# Patient Record
Sex: MALE | Race: BLACK OR AFRICAN AMERICAN | NOT HISPANIC OR LATINO | URBAN - METROPOLITAN AREA
[De-identification: names, ages, dates, MRNs, and addresses within clinical notes are randomized per-mention and may not be internally consistent; named-entity substitution may affect disease eponyms.]

---

## 2017-11-27 ENCOUNTER — EMERGENCY (EMERGENCY)
Facility: HOSPITAL | Age: 57
LOS: 1 days | Discharge: HOME | End: 2017-11-27
Attending: EMERGENCY MEDICINE
Payer: COMMERCIAL

## 2017-11-27 VITALS
SYSTOLIC BLOOD PRESSURE: 219 MMHG | OXYGEN SATURATION: 98 % | HEIGHT: 64 IN | HEART RATE: 70 BPM | DIASTOLIC BLOOD PRESSURE: 115 MMHG | TEMPERATURE: 98 F | WEIGHT: 177.91 LBS | RESPIRATION RATE: 18 BRPM

## 2017-11-27 VITALS
RESPIRATION RATE: 18 BRPM | TEMPERATURE: 98 F | DIASTOLIC BLOOD PRESSURE: 99 MMHG | HEART RATE: 78 BPM | OXYGEN SATURATION: 100 % | SYSTOLIC BLOOD PRESSURE: 194 MMHG

## 2017-11-27 PROCEDURE — 99283 EMERGENCY DEPT VISIT LOW MDM: CPT

## 2017-11-27 RX ORDER — AMLODIPINE BESYLATE 2.5 MG/1
5 TABLET ORAL ONCE
Qty: 0 | Refills: 0 | Status: COMPLETED | OUTPATIENT
Start: 2017-11-27 | End: 2017-11-27

## 2017-11-27 RX ORDER — ATENOLOL 25 MG/1
50 TABLET ORAL ONCE
Qty: 0 | Refills: 0 | Status: COMPLETED | OUTPATIENT
Start: 2017-11-27 | End: 2017-11-27

## 2017-11-27 RX ADMIN — ATENOLOL 50 MILLIGRAM(S): 25 TABLET ORAL at 16:45

## 2017-11-27 RX ADMIN — AMLODIPINE BESYLATE 5 MILLIGRAM(S): 2.5 TABLET ORAL at 16:45

## 2017-11-27 NOTE — ED PROVIDER NOTE - OBJECTIVE STATEMENT
57M hx HTN sent from sports medicine clinic for hypertensive readings.  Pt states he forget to take his medications today and occasionally will miss a dose.  Pt asymptomatic, denying headache, visual changes, chest pain shortness of breath, change in uirnary function.  Pt states he feel his normal baseline.  Follows at sports clinic for work-related musculoskeletal injury involving the upper extremities and back.      Amlodopine, Quinalapril, Atenolol

## 2017-11-27 NOTE — ED PROVIDER NOTE - PLAN OF CARE
Take your medications as prescribed.   Follow-up with your primary doctor.  Return for any new/worsening symptoms or concerns.

## 2017-11-27 NOTE — ED PROVIDER NOTE - MEDICAL DECISION MAKING DETAILS
57M hx HTN presents for HTN reading at clinic.  missed morning meds.  asymptomatic.  well-appearing.  exam unremarkable.  will give home meds expect quinalapril which is not carried in hospital pharmacy and discharge with pmd follow-up and return precautions.

## 2017-11-27 NOTE — ED PROVIDER NOTE - CARE PLAN
Principal Discharge DX:	HTN (hypertension)  Instructions for follow-up, activity and diet:	Take your medications as prescribed.   Follow-up with your primary doctor.  Return for any new/worsening symptoms or concerns.

## 2017-11-27 NOTE — ED PROVIDER NOTE - PHYSICAL EXAMINATION
(Attending - Conchis) NAD, AAOx3, NCAT, MMM, Trachea midline, PERRL, CTAB, Non-tachy, Normal perfusion, 2+ radial pulses b/l, soft, NTND, No edema, No deformity of extremities, Appropriate, Cooperative, CN grossly intact, Normal coordination

## 2017-11-27 NOTE — ED ADULT TRIAGE NOTE - CHIEF COMPLAINT QUOTE
as per  the ems pt bought in from the dr office for elevated blood pressure . pt denies any chest pain

## 2017-11-27 NOTE — ED ADULT NURSE NOTE - OBJECTIVE STATEMENT
Pt went to see MD and BP was high as per pt. Repeat /103. Pt very anxious, states he did not take todays BP meds. Denies chest pain, headache, dizziness, vision changes, asymptomatic.

## 2018-08-17 ENCOUNTER — TRANSCRIPTION ENCOUNTER (OUTPATIENT)
Age: 58
End: 2018-08-17

## 2018-10-15 ENCOUNTER — APPOINTMENT (OUTPATIENT)
Dept: GASTROENTEROLOGY | Facility: CLINIC | Age: 58
End: 2018-10-15

## 2018-12-03 NOTE — ED ADULT NURSE NOTE - PRIMARY CARE PROVIDER
Telephone Encounter by Samia Hartley at 09/07/18 09:56 AM     Author:  Samia Hartley Service:  (none) Author Type:  Patient      Filed:  09/07/18 09:58 AM Encounter Date:  9/7/2018 Status:  Signed     :  Samia Hartley (Patient )              PERLITA HWANG    Patient Age: 55 year old   Refill request by:[NP1.1T] Phone.  Caller informed to check with the pharmacy later for their refill.  If problems arise, we will contact patient.[NP1.1M]  Refill to be:[NP1.1T] ePrescribed to[NP1.1M]   Pharmacy     [place] [id number] [facility] [id number]    Phone: [phone][NP1.2T]          Medication requested to be refilled:[NP1.1T]   Requested Prescriptions     Pending Prescriptions Disp Refills   • losartan (COZAAR) 25 MG tablet 90 Tab 1     Sig: Take 1 Tab by mouth daily. Collaborating MD: Dr. Mirella Montague MD[NP1.2T]           Next and Last Visit with Provider and Department  Next visit with [facility] is on No match found  Next visit with [facility] is on No match found   Last visit with [facility] was on 05/21/2018 at  3:20 PM in [facility]  Last visit with [facility] was on 05/21/2018 at  3:20 PM in [facility]      WEIGHT AND HEIGHT: As of 05/21/2018 weight is 130 lbs.(58.968 kg). Height is 5' 0\"(1.524 m).   BMI is 25.39 kg/(m^2) calculated from:     Height 5' 0\" (1.524 m) as of 5/21/18     Weight 130 lb (58.968 kg) as of 5/21/18[NP1.1T]      No Known Allergies[NP1.2T]  Current outpatient prescriptions       Medication  Sig Dispense Refill   • bisacodyl (DULCOLAX) 5 MG EC tablet See Colonoscopy Instructions. 2 Tab 0   • simethicone (MYLICON) 125 MG chewable tablet See Colonoscopy instructions. 2 Tab 0   • PEG 3350-KCl-NaBcb-NaCl-NaSulf (GOLYTELY) 236 G suspension See Colonoscopy Instructions. 4000 mL 0   • losartan (COZAAR) 25 MG tablet Take 1 Tab by mouth daily. Collaborating MD: Dr. Mirella Montague,  MD 90 Tab 1   • trazodone (DESYREL) 50 MG tablet Take 1 Tab by mouth nightly. Collaborating MD: Dr. Mirella Montague MD 30 Tab 0   • lorazepam (ATIVAN) 0.5 MG tablet Take 1 Tab by mouth nightly as needed for Anxiety. Collaborating MD: Dr. Mirella Montague MD 20 Tab 0   • zolpidem (AMBIEN CR) 12.5 MG CR tablet Take 1 Tab by mouth nightly as needed for Sleep. Collaborating MD: Dr. Mirella Montague MD 30 Tab 1        ROUTING:[NP1.1T] Patient's physician/staff[NP1.1M]        PCP: Mirella Montague MD         INS: Payor: [employer] / Plan: *No Plan* / Product Type: *No Product type* / Note: This is the primary coverage, but no account was found for this location or the patient's primary location.   ADDRESS:  [address] [place] [address] [address] [place] [address][NP1.1T]       Revision History        User Key Date/Time User Provider Type Action    > NP1.2 09/07/18 09:58 AM Samia Hartley Patient  Sign     NP1.1 09/07/18 09:56 AM Samia Hartley Patient      M - Manual, T - Template             none

## 2019-01-03 ENCOUNTER — APPOINTMENT (OUTPATIENT)
Dept: GASTROENTEROLOGY | Facility: CLINIC | Age: 59
End: 2019-01-03

## 2021-10-08 ENCOUNTER — NON-APPOINTMENT (OUTPATIENT)
Age: 61
End: 2021-10-08

## 2021-10-08 ENCOUNTER — APPOINTMENT (OUTPATIENT)
Dept: CARDIOLOGY | Facility: CLINIC | Age: 61
End: 2021-10-08
Payer: COMMERCIAL

## 2021-10-08 VITALS — DIASTOLIC BLOOD PRESSURE: 90 MMHG | SYSTOLIC BLOOD PRESSURE: 150 MMHG

## 2021-10-08 VITALS
OXYGEN SATURATION: 97 % | WEIGHT: 178 LBS | DIASTOLIC BLOOD PRESSURE: 93 MMHG | BODY MASS INDEX: 29.3 KG/M2 | RESPIRATION RATE: 16 BRPM | SYSTOLIC BLOOD PRESSURE: 179 MMHG | HEART RATE: 66 BPM | HEIGHT: 65.5 IN

## 2021-10-08 DIAGNOSIS — M10.9 GOUT, UNSPECIFIED: ICD-10-CM

## 2021-10-08 DIAGNOSIS — M79.606 PAIN IN LEG, UNSPECIFIED: ICD-10-CM

## 2021-10-08 PROCEDURE — 93000 ELECTROCARDIOGRAM COMPLETE: CPT

## 2021-10-08 PROCEDURE — 99204 OFFICE O/P NEW MOD 45 MIN: CPT

## 2021-10-08 RX ORDER — ATENOLOL 25 MG/1
25 TABLET ORAL DAILY
Refills: 0 | Status: DISCONTINUED | COMMUNITY
End: 2021-10-08

## 2021-10-08 RX ORDER — QUINAPRIL HYDROCHLORIDE 40 MG/1
40 TABLET, FILM COATED ORAL DAILY
Refills: 0 | Status: DISCONTINUED | COMMUNITY
End: 2021-10-08

## 2021-10-08 NOTE — PHYSICAL EXAM
[Soft] : abdomen soft [Non Tender] : non-tender [Normal] : no edema, no cyanosis, no clubbing, no varicosities [Moves all extremities] : moves all extremities [Alert and Oriented] : alert and oriented [Normal S1, S2] : normal S1, S2 [No Murmur] : no murmur [S4] : S4

## 2021-10-12 ENCOUNTER — APPOINTMENT (OUTPATIENT)
Dept: CARDIOLOGY | Facility: CLINIC | Age: 61
End: 2021-10-12
Payer: COMMERCIAL

## 2021-10-12 PROCEDURE — 93923 UPR/LXTR ART STDY 3+ LVLS: CPT

## 2021-10-20 ENCOUNTER — APPOINTMENT (OUTPATIENT)
Dept: CARDIOLOGY | Facility: CLINIC | Age: 61
End: 2021-10-20
Payer: COMMERCIAL

## 2021-10-20 PROCEDURE — 93306 TTE W/DOPPLER COMPLETE: CPT

## 2021-11-19 ENCOUNTER — APPOINTMENT (OUTPATIENT)
Dept: CARDIOLOGY | Facility: CLINIC | Age: 61
End: 2021-11-19
Payer: COMMERCIAL

## 2021-11-19 PROCEDURE — A9500: CPT

## 2021-11-19 PROCEDURE — 78452 HT MUSCLE IMAGE SPECT MULT: CPT

## 2021-11-19 PROCEDURE — 93015 CV STRESS TEST SUPVJ I&R: CPT

## 2021-11-19 RX ADMIN — REGADENOSON 0 MG/5ML: 0.08 INJECTION, SOLUTION INTRAVENOUS at 00:00

## 2021-11-23 RX ORDER — REGADENOSON 0.08 MG/ML
0.4 INJECTION, SOLUTION INTRAVENOUS
Qty: 4 | Refills: 0 | Status: COMPLETED | OUTPATIENT
Start: 2021-11-19

## 2021-12-07 NOTE — ADDENDUM
[FreeTextEntry1] : 12/7/2021: Echo with normal LV function and nuclear stress testing without ischemia. Low CV risk for prostate biopsy

## 2021-12-07 NOTE — DISCUSSION/SUMMARY
[FreeTextEntry1] : Patient is a 62yo M with HTN, HLD, gout, CKD here for cardiac evaluation of an abnormal ECG.\par -ECG mildly abnormal with iRBBB and borderline LAFB, needs further eval for structural disease and ischemia\par -Mild dyspnea as well\par -Multiple risk factors\par -BP high, even on recheck and 140s systolic at home per patient. ALso S4 on exam\par \par 1. Echo and nuclear stress test to evaluate dyspnea/abnormal ECG/HTN/CKD/HLD\par 2. ELIZABETH to eval leg symptoms, ? claudication\par 3. States had been on atenolol but  ? recently stopped, will use metoprolol ER 25mg daily instead now as needs better BP control. Also mild renal insufficiency\par 4. Needs to take hydralazine TID As well\par 5. ADd statin for HLD, atorvastatin 40mg qhs\par 6. REnal and urology eval pending for CKD and elevated PSA\par 7. Recommend aggressive diet and lifestyle modifications \par 7. Follow up after testing

## 2021-12-07 NOTE — HISTORY OF PRESENT ILLNESS
[FreeTextEntry1] : Patient is a 62yo M with HTN, HLD, gout, CKD here for cardiac evaluation of an abnormal ECG. NO regular exercise. Denies any chest pain/pressure, does get intermittent chronic shoulder pain and limited ROM. If exerts himself will note some trouble breathing. Mild dyspnea going up stairs and legs get weak and pain. FEels like burning pain. \par HAd only been taking hydralazine daily instead of TID. \par \par Works as .  with 4 kids that are grown. \par \par ROS: nighttime heart burn, all others negative

## 2021-12-07 NOTE — ASSESSMENT
[FreeTextEntry1] : ECG: SR, iRBBB, leftward axis\par \par \par Creat 1.56\par  HDL 32   (8/2021) \par A1C 5.9

## 2021-12-17 ENCOUNTER — APPOINTMENT (OUTPATIENT)
Dept: CARDIOLOGY | Facility: CLINIC | Age: 61
End: 2021-12-17
Payer: COMMERCIAL

## 2021-12-17 VITALS
WEIGHT: 176 LBS | BODY MASS INDEX: 28.97 KG/M2 | HEIGHT: 65.5 IN | SYSTOLIC BLOOD PRESSURE: 152 MMHG | RESPIRATION RATE: 16 BRPM | HEART RATE: 84 BPM | DIASTOLIC BLOOD PRESSURE: 100 MMHG | OXYGEN SATURATION: 96 %

## 2021-12-17 DIAGNOSIS — R06.00 DYSPNEA, UNSPECIFIED: ICD-10-CM

## 2021-12-17 PROCEDURE — 99214 OFFICE O/P EST MOD 30 MIN: CPT

## 2021-12-17 RX ORDER — HYDRALAZINE HYDROCHLORIDE 50 MG/1
50 TABLET ORAL 3 TIMES DAILY
Refills: 0 | Status: DISCONTINUED | COMMUNITY
End: 2021-12-17

## 2021-12-17 RX ORDER — LISINOPRIL 40 MG/1
40 TABLET ORAL DAILY
Qty: 90 | Refills: 2 | Status: DISCONTINUED | COMMUNITY
Start: 2021-10-08 | End: 2021-12-17

## 2021-12-17 NOTE — DISCUSSION/SUMMARY
[FreeTextEntry1] : Patient is a 62yo M with HTN, HLD, gout, CKD here for cardiac evaluation of an abnormal ECG.\par -ECG mildly abnormal with iRBBB and borderline LAFB, \par -Echo 10/2021 with mild LVH (likely due to HTN) but normal BiV function, no PHTN, Mild MR needs surveillance only\par -Normal ELIZABETH/PVR 2021\par -PHARM NUCLEAR stress test 11/2021 negative for ischemia/infarct \par -Still needs better BP control , at home states numbers 140/80s. Currently taking hydralazine 100mg bid instead of 50 bid. Also on amlodipine 10mg daily, metoprolol ER 25mg daily. DOesnt think taking Lisinopril. \par \par \par 1. Increase metoprolol ER to 50mg daily\par 2. Recommend 30 minutes moderate intensity aerobic activity 5 days per week , leg symptoms from deconditioning\par 3. Continue statin\par 4. Recommend aggressive diet and lifestyle modifications , needs to cut back sodium. F/u nutritionist as well\par 5. REnal and urology follow up , biopsy results pending\par 6. FOllow up 3 months to re-evaluate BP, check BID the 2 weeks prior and bring in cuff

## 2021-12-17 NOTE — HISTORY OF PRESENT ILLNESS
[FreeTextEntry1] : Patient is a 60yo M with HTN, HLD, gout, CKD here for cardiac follow up  of an abnormal ECG. NO regular exercise. Denies any chest pain/pressure, does get intermittent chronic shoulder pain and limited ROM. If exerts himself will note some trouble breathing. Mild dyspnea going up stairs and legs get weak and pain. FEels like burning pain. Patient underwent cardiac testing after last visit. Put on metoprolol, advised taking hydralazine TID and started statin. HAd prostate biopsy recently, awaiting results. No recurrent dyspnea and no CP. \par \par Works as .  with 4 kids that are grown. \par \par ROS: nighttime heart burn, all others negative

## 2021-12-17 NOTE — PHYSICAL EXAM
[Normal S1, S2] : normal S1, S2 [No Murmur] : no murmur [S4] : S4 [Soft] : abdomen soft [Non Tender] : non-tender [Normal] : no edema, no cyanosis, no clubbing, no varicosities [Moves all extremities] : moves all extremities [Alert and Oriented] : alert and oriented

## 2021-12-17 NOTE — ASSESSMENT
[FreeTextEntry1] : \par \par Creat 1.56\par  HDL 32   (8/2021) \par A1C 5.9 \par \par ELIZABETH/PVR 10/2021:\par 1. R 1.02 L 1.02\par 2. Normal PVR\par \par ECHO 10/2021:\par 1. Mild LVH, EF 60-65%\par 2. Grade I diastolic dysfunction\par 3. Normal RV/LA/RA \par 4. Trivial AI\par 5. Mild MR \par \par PHARM NUCLEAR STRESS TEST 11/2021:\par 1. Negative for ischemia/infarct, EF 62%\par 2. BP hypertensive baseline, normal response

## 2022-01-13 RX ORDER — KIT FOR THE PREPARATION OF TECHNETIUM TC99M SESTAMIBI 1 MG/5ML
INJECTION, POWDER, LYOPHILIZED, FOR SOLUTION PARENTERAL
Refills: 0 | Status: COMPLETED | OUTPATIENT
Start: 2022-01-13

## 2022-01-13 RX ADMIN — KIT FOR THE PREPARATION OF TECHNETIUM TC99M SESTAMIBI 0: 1 INJECTION, POWDER, LYOPHILIZED, FOR SOLUTION PARENTERAL at 00:00

## 2022-04-29 ENCOUNTER — NON-APPOINTMENT (OUTPATIENT)
Age: 62
End: 2022-04-29

## 2022-04-29 ENCOUNTER — APPOINTMENT (OUTPATIENT)
Dept: CARDIOLOGY | Facility: CLINIC | Age: 62
End: 2022-04-29
Payer: COMMERCIAL

## 2022-04-29 VITALS
HEART RATE: 76 BPM | SYSTOLIC BLOOD PRESSURE: 150 MMHG | OXYGEN SATURATION: 95 % | HEIGHT: 65.5 IN | WEIGHT: 183 LBS | RESPIRATION RATE: 16 BRPM | DIASTOLIC BLOOD PRESSURE: 94 MMHG | BODY MASS INDEX: 30.12 KG/M2

## 2022-04-29 PROCEDURE — 99214 OFFICE O/P EST MOD 30 MIN: CPT

## 2022-04-29 PROCEDURE — 93000 ELECTROCARDIOGRAM COMPLETE: CPT

## 2022-04-29 RX ORDER — DUTASTERIDE AND TAMSULOSIN HYDROCHLORIDE .5; .4 MG/1; MG/1
CAPSULE ORAL
Refills: 0 | Status: ACTIVE | COMMUNITY

## 2022-04-29 RX ORDER — OMEPRAZOLE 40 MG/1
40 CAPSULE, DELAYED RELEASE ORAL
Refills: 0 | Status: ACTIVE | COMMUNITY

## 2022-04-29 NOTE — ASSESSMENT
[FreeTextEntry1] : ECG: SR, inferior Qs, IVCD, NSST\par \par Creat 1.56\par  HDL 32   (8/2021) \par A1C 5.9 \par \par ELIZABETH/PVR 10/2021:\par 1. R 1.02 L 1.02\par 2. Normal PVR\par \par ECHO 10/2021:\par 1. Mild LVH, EF 60-65%\par 2. Grade I diastolic dysfunction\par 3. Normal RV/LA/RA \par 4. Trivial AI\par 5. Mild MR \par \par PHARM NUCLEAR STRESS TEST 11/2021:\par 1. Negative for ischemia/infarct, EF 62%\par 2. BP hypertensive baseline, normal response

## 2022-04-29 NOTE — DISCUSSION/SUMMARY
[FreeTextEntry1] : Patient is a 61yo M with HTN, HLD, gout, CKD here for cardiac follow up\par -Echo 10/2021 with mild LVH (likely due to HTN) but normal BiV function, no PHTN, Mild MR\par -Normal ELIZABETH/PVR 2021\par -PHARM NUCLEAR stress test 11/2021 negative for ischemia/infarct \par -BP high today, seems component white coat HTN but also misses doses of meds at times. Will increase beta blocker and have him monitor at home. \par \par \par 1. INcrease metoprolol ER to 100mg daily, no other med changes\par 2. Recommend 30 minutes moderate intensity aerobic activity 5 days per week \par 3. Continue statin\par 4. Recommend aggressive diet and lifestyle modifications , needs to cut back sodium. \par 5. Recheck BW with PMD\par 6. Follow up 3 months, will take next couple weeks to monitor BP at home as well

## 2022-04-29 NOTE — HISTORY OF PRESENT ILLNESS
[FreeTextEntry1] : Patient is a 63yo M with HTN, HLD, GERD, gout, CKD here for cardiac follow up. NO regular exercise. Denies any chest pain/pressure, does get intermittent chronic shoulder pain and limited ROM. No CP/SOB Patient denies PND/orthopnea/edema/palpitations/syncope/claudication. Increased metoprolol to 50mg at last visit and added statin. HAs not been checking BP at home lately. Missed couple days of BP meds, did take today. \par \par Works as .  with 4 kids that are grown. \par \par ROS: nighttime heart burn still, all others negative

## 2022-05-27 ENCOUNTER — NON-APPOINTMENT (OUTPATIENT)
Age: 62
End: 2022-05-27

## 2022-07-28 NOTE — HISTORY OF PRESENT ILLNESS
[FreeTextEntry1] : Patient is a 61yo M with HTN, HLD, GERD, gout, CKD here for cardiac follow up. NO regular exercise. Denies any chest pain/pressure, does get intermittent chronic shoulder pain and limited ROM. No CP/SOB Patient denies PND/orthopnea/edema/palpitations/syncope/claudication. Increased metoprolol to 100mg at last visit\par \par Works as .  with 4 kids that are grown. \par \par ROS: nighttime heart burn still, all others negative

## 2022-07-28 NOTE — DISCUSSION/SUMMARY
[FreeTextEntry1] : Patient is a 63yo M with HTN, HLD, gout, CKD here for cardiac follow up\par -Echo 10/2021 with mild LVH (likely due to HTN) but normal BiV function, no PHTN, Mild MR\par -Normal ELIZABETH/PVR 2021\par -PHARM NUCLEAR stress test 11/2021 negative for ischemia/infarct \par -\par \par \par 1. \par 2. Recommend 30 minutes moderate intensity aerobic activity 5 days per week \par 3. Continue statin\par 4. Recommend aggressive diet and lifestyle modifications , needs to cut back sodium. \par 5.

## 2022-07-29 ENCOUNTER — APPOINTMENT (OUTPATIENT)
Dept: CARDIOLOGY | Facility: CLINIC | Age: 62
End: 2022-07-29

## 2022-12-06 ENCOUNTER — NON-APPOINTMENT (OUTPATIENT)
Age: 62
End: 2022-12-06

## 2022-12-06 ENCOUNTER — APPOINTMENT (OUTPATIENT)
Dept: CARDIOLOGY | Facility: CLINIC | Age: 62
End: 2022-12-06
Payer: COMMERCIAL

## 2022-12-06 VITALS — HEART RATE: 97 BPM | WEIGHT: 189 LBS | BODY MASS INDEX: 31.49 KG/M2 | HEIGHT: 65 IN | RESPIRATION RATE: 14 BRPM

## 2022-12-06 VITALS — DIASTOLIC BLOOD PRESSURE: 70 MMHG | SYSTOLIC BLOOD PRESSURE: 148 MMHG

## 2022-12-06 VITALS — SYSTOLIC BLOOD PRESSURE: 130 MMHG | DIASTOLIC BLOOD PRESSURE: 86 MMHG

## 2022-12-06 PROCEDURE — 93000 ELECTROCARDIOGRAM COMPLETE: CPT

## 2022-12-06 PROCEDURE — 99214 OFFICE O/P EST MOD 30 MIN: CPT | Mod: 25

## 2022-12-06 RX ORDER — SILDENAFIL 100 MG/1
100 TABLET, FILM COATED ORAL
Qty: 8 | Refills: 0 | Status: DISCONTINUED | COMMUNITY
Start: 2022-07-26

## 2022-12-06 RX ORDER — DESMOPRESSIN ACETATE 0.2 MG/1
0.2 TABLET ORAL
Qty: 90 | Refills: 0 | Status: ACTIVE | COMMUNITY
Start: 2022-10-31

## 2022-12-06 NOTE — ASSESSMENT
[FreeTextEntry1] : ECG: SR, LAFB , NSST\par \par Creat 1.56\par  HDL 32   (8/2021) \par A1C 5.9 \par \par ELIZABETH/PVR 10/2021:\par 1. R 1.02 L 1.02\par 2. Normal PVR\par \par ECHO 10/2021:\par 1. Mild LVH, EF 60-65%\par 2. Grade I diastolic dysfunction\par 3. Normal RV/LA/RA \par 4. Trivial AI\par 5. Mild MR \par \par PHARM NUCLEAR STRESS TEST 11/2021:\par 1. Negative for ischemia/infarct, EF 62%\par 2. BP hypertensive baseline, normal response

## 2022-12-06 NOTE — HISTORY OF PRESENT ILLNESS
[FreeTextEntry1] : Patient is a 61yo M with HTN, HLD, GERD, gout, CKD here for cardiac follow up. NO regular exercise. Denies any chest pain/pressure, does get intermittent chronic shoulder pain and limited ROM. No CP/SOB. Patient denies PND/orthopnea/edema/palpitations/syncope/claudication. Increased metoprolol to 100mg at last visit. States has some sinus congestion chronically, can lead to mild dyspnea. \par \par Works as .  with 4 kids that are grown. \par \par ROS: nighttime heart burn still, all others negative

## 2022-12-23 ENCOUNTER — APPOINTMENT (OUTPATIENT)
Dept: CARDIOLOGY | Facility: CLINIC | Age: 62
End: 2022-12-23

## 2023-01-20 ENCOUNTER — NON-APPOINTMENT (OUTPATIENT)
Age: 63
End: 2023-01-20

## 2023-01-20 ENCOUNTER — APPOINTMENT (OUTPATIENT)
Dept: CARDIOLOGY | Facility: CLINIC | Age: 63
End: 2023-01-20
Payer: COMMERCIAL

## 2023-01-20 VITALS
WEIGHT: 184 LBS | HEART RATE: 70 BPM | SYSTOLIC BLOOD PRESSURE: 140 MMHG | OXYGEN SATURATION: 98 % | RESPIRATION RATE: 16 BRPM | BODY MASS INDEX: 30.66 KG/M2 | HEIGHT: 65 IN | DIASTOLIC BLOOD PRESSURE: 88 MMHG

## 2023-01-20 PROCEDURE — 99214 OFFICE O/P EST MOD 30 MIN: CPT | Mod: 25

## 2023-01-20 PROCEDURE — 93000 ELECTROCARDIOGRAM COMPLETE: CPT

## 2023-01-20 NOTE — ASSESSMENT
[FreeTextEntry1] : ECG: SR, LAFB vs prior IVWM, NSST, 1st degree AV delay \par \par Creat 1.56\par  HDL 32   (8/2021) \par A1C 5.9 \par \par ELIZABETH/PVR 10/2021:\par 1. R 1.02 L 1.02\par 2. Normal PVR\par \par ECHO 10/2021:\par 1. Mild LVH, EF 60-65%\par 2. Grade I diastolic dysfunction\par 3. Normal RV/LA/RA \par 4. Trivial AI\par 5. Mild MR \par \par PHARM NUCLEAR STRESS TEST 11/2021:\par 1. Negative for ischemia/infarct, EF 62%\par 2. BP hypertensive baseline, normal response

## 2023-01-20 NOTE — HISTORY OF PRESENT ILLNESS
[FreeTextEntry1] : Patient is a 61yo M with HTN, HLD, GERD, gout, CKD here for cardiac follow up. NO regular exercise. Denies any chest pain/pressure, does get intermittent chronic shoulder pain and limited ROM. No CP/SOB. Patient denies PND/orthopnea/edema/palpitations/syncope/claudication. Increased metoprolol to 100mg prior to last OV. \par \par Lisinopril added last OV, planned for echo but did not show for appointment. Also has not gone BW yet (forgot)\par \par Works as .  with 4 kids that are grown. \par \par ROS: nighttime heart burn still, all others negative

## 2023-01-20 NOTE — DISCUSSION/SUMMARY
[FreeTextEntry1] : Patient is a 63yo M with HTN, HLD, gout, CKD here for cardiac follow up\par -Echo 10/2021 with mild LVH (likely due to HTN) but normal BiV function, no PHTN, Mild MR\par -Normal ELIZABETH/PVR 2021\par -PHARM NUCLEAR stress test 11/2021 negative for ischemia/infarct \par \par -BP still could be better, high at home. Did go up on recheck here. Component of white coat HTN as well but needs better control.  Needs to take his hydralazine bid as well, will consider titrating back with increasing ACEI if possilbe. Unfortunately has not been taking hydralazine at all now and missed meds this morning. REpeat SBP was 160s today\par \par -Mild increase in AV delay, maybe related to metoprolol. No symptoms, no  this time\par \par 1. GEt back on hydralazine and needs BW done before adjusting Lisinopril \par 2. Recommend 30 minutes moderate intensity aerobic activity 5 days per week \par 3. Continue statin\par 4. Recommend aggressive diet and lifestyle modifications , needs to cut back sodium. \par 5. Echo to re-evaluate his LVH and LAFB/?IWMI on ECG today, missed appointment\par 6. GI follow up for GERD\par 7. Consider Sleep study given snoring, obesity and resistant HTN\par 8. Follow up 3 months, can call with echo results [EKG obtained to assist in diagnosis and management of assessed problem(s)] : EKG obtained to assist in diagnosis and management of assessed problem(s)

## 2023-02-06 ENCOUNTER — APPOINTMENT (OUTPATIENT)
Dept: CARDIOLOGY | Facility: CLINIC | Age: 63
End: 2023-02-06
Payer: COMMERCIAL

## 2023-02-06 PROCEDURE — 93306 TTE W/DOPPLER COMPLETE: CPT

## 2023-03-17 ENCOUNTER — APPOINTMENT (OUTPATIENT)
Dept: ORTHOPEDIC SURGERY | Facility: CLINIC | Age: 63
End: 2023-03-17
Payer: OTHER MISCELLANEOUS

## 2023-03-17 ENCOUNTER — NON-APPOINTMENT (OUTPATIENT)
Age: 63
End: 2023-03-17

## 2023-03-17 VITALS — WEIGHT: 184 LBS | HEIGHT: 65 IN | BODY MASS INDEX: 30.66 KG/M2

## 2023-03-17 DIAGNOSIS — Z00.00 ENCOUNTER FOR GENERAL ADULT MEDICAL EXAMINATION W/OUT ABNORMAL FINDINGS: ICD-10-CM

## 2023-03-17 DIAGNOSIS — I10 ESSENTIAL (PRIMARY) HYPERTENSION: ICD-10-CM

## 2023-03-17 DIAGNOSIS — E78.00 PURE HYPERCHOLESTEROLEMIA, UNSPECIFIED: ICD-10-CM

## 2023-03-17 PROCEDURE — 99204 OFFICE O/P NEW MOD 45 MIN: CPT

## 2023-03-17 PROCEDURE — 73030 X-RAY EXAM OF SHOULDER: CPT | Mod: RT

## 2023-03-17 PROCEDURE — 73010 X-RAY EXAM OF SHOULDER BLADE: CPT | Mod: RT

## 2023-03-17 PROCEDURE — 99072 ADDL SUPL MATRL&STAF TM PHE: CPT

## 2023-03-17 RX ORDER — METHYLPREDNISOLONE 4 MG/1
4 TABLET ORAL
Qty: 1 | Refills: 0 | Status: ACTIVE | COMMUNITY
Start: 2023-03-17 | End: 1900-01-01

## 2023-03-17 NOTE — ASSESSMENT
[FreeTextEntry1] : R calcific tendonitis, possible tear.\par H/o prior WC injury to shoulders.\par Ice.\par MDP.\par MRI R shoulder.\par OOW.\par RTO 2-3 weeks.
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
## 2023-03-17 NOTE — WORK
[Sprain/Strain] : sprain/strain [Torn Ligament/Tendon/Muscle] : torn ligament, tendon or muscle [Was the competent medical cause of the injury] : was the competent medical cause of the injury [Are consistent with the injury] : are consistent with the injury [Consistent with my objective findings] : consistent with my objective findings [Total (100%)] : total (100%) [Reveals pre-existing condition(s) that may affect treatment/prognosis] : reveals pre-existing condition(s) that may affect treatment/prognosis [Can return to work with limitations on ______] : can return to work with limitations on [unfilled] [FreeTextEntry2] : prior WC injury to b/l shoulders

## 2023-03-18 ENCOUNTER — FORM ENCOUNTER (OUTPATIENT)
Age: 63
End: 2023-03-18

## 2023-03-19 ENCOUNTER — APPOINTMENT (OUTPATIENT)
Dept: MRI IMAGING | Facility: CLINIC | Age: 63
End: 2023-03-19
Payer: OTHER MISCELLANEOUS

## 2023-03-19 PROCEDURE — 73221 MRI JOINT UPR EXTREM W/O DYE: CPT | Mod: RT

## 2023-03-29 ENCOUNTER — NON-APPOINTMENT (OUTPATIENT)
Age: 63
End: 2023-03-29

## 2023-03-29 ENCOUNTER — APPOINTMENT (OUTPATIENT)
Dept: ORTHOPEDIC SURGERY | Facility: CLINIC | Age: 63
End: 2023-03-29
Payer: OTHER MISCELLANEOUS

## 2023-03-29 VITALS — WEIGHT: 184 LBS | HEIGHT: 65 IN | BODY MASS INDEX: 30.66 KG/M2

## 2023-03-29 PROCEDURE — 99215 OFFICE O/P EST HI 40 MIN: CPT

## 2023-03-29 NOTE — ASSESSMENT
[FreeTextEntry1] : R calcific tendonitis.\par H/o prior WC injury to shoulders.\par MRI R shoulder reviewed - full-thickness supra tear with retraction and atrophy, partial infra and subscap tears, GH DJD, bursitis.\par I do not think this tear is repairable. \par Discussed conservative vs surgical treatment options.\par Reviewed R SA, GHD, SAD, possible RCR, placement of balloon spacer vs RSA.\par He will start PT. \par Consider SA injection.\par RTW light duty 4/3/23.\par RTO 6 weeks.
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
## 2023-03-29 NOTE — WORK
[Sprain/Strain] : sprain/strain [Torn Ligament/Tendon/Muscle] : torn ligament, tendon or muscle [Was the competent medical cause of the injury] : was the competent medical cause of the injury [Are consistent with the injury] : are consistent with the injury [Consistent with my objective findings] : consistent with my objective findings [Reveals pre-existing condition(s) that may affect treatment/prognosis] : reveals pre-existing condition(s) that may affect treatment/prognosis [Can return to work with limitations on ______] : can return to work with limitations on [unfilled] [Partial] : partial [FreeTextEntry2] : prior WC injury to b/l shoulders

## 2023-03-29 NOTE — DATA REVIEWED
[MRI] : MRI [Right] : of the right [Shoulder] : shoulder [I independently reviewed and interpreted images and report] : I independently reviewed and interpreted images and report [FreeTextEntry1] : full-thickness supra tear with retraction and atrophy, partial infra and subscap tears, GH DJD, bursitis

## 2023-04-07 ENCOUNTER — NON-APPOINTMENT (OUTPATIENT)
Age: 63
End: 2023-04-07

## 2023-04-21 ENCOUNTER — APPOINTMENT (OUTPATIENT)
Dept: ORTHOPEDIC SURGERY | Facility: CLINIC | Age: 63
End: 2023-04-21
Payer: OTHER MISCELLANEOUS

## 2023-04-21 VITALS — BODY MASS INDEX: 30.66 KG/M2 | HEIGHT: 65 IN | WEIGHT: 184 LBS

## 2023-04-21 DIAGNOSIS — S46.012A STRAIN OF MUSCLE(S) AND TENDON(S) OF THE ROTATOR CUFF OF LEFT SHOULDER, INITIAL ENCOUNTER: ICD-10-CM

## 2023-04-21 PROCEDURE — 99214 OFFICE O/P EST MOD 30 MIN: CPT

## 2023-04-21 PROCEDURE — 73030 X-RAY EXAM OF SHOULDER: CPT | Mod: LT

## 2023-04-21 PROCEDURE — 73010 X-RAY EXAM OF SHOULDER BLADE: CPT | Mod: LT

## 2023-04-21 NOTE — IMAGING
[Left] : left shoulder [OS acromiale] : OS acromiale [FreeTextEntry1] : The Gh joint is oK.  There are some AC changes. [FreeTextEntry5] : There is a Type II acromion.

## 2023-04-21 NOTE — ASSESSMENT
[FreeTextEntry1] : Shoulder strain after work injury.\par Recommend MRI to eval RCT.\par Ice, rest.\par Diclofenac prn. \par He is working.\par RTO after MRI.\par \par Patient seen by Dr. Juice Zaidi.\par Patient seen by Radha HENDERSON under the supervision of Dr. Juice Zaidi.

## 2023-04-21 NOTE — PHYSICAL EXAM
[Left] : left shoulder [4 ___] : forward flexion 4[unfilled]/5 [5___] : external rotation 5[unfilled]/5 [] : no sensory deficits [FreeTextEntry9] : FF 130P\par ER 45P [de-identified] : +arc
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
## 2023-04-25 ENCOUNTER — APPOINTMENT (OUTPATIENT)
Dept: CARDIOLOGY | Facility: CLINIC | Age: 63
End: 2023-04-25
Payer: COMMERCIAL

## 2023-04-25 ENCOUNTER — NON-APPOINTMENT (OUTPATIENT)
Age: 63
End: 2023-04-25

## 2023-04-25 VITALS
OXYGEN SATURATION: 97 % | WEIGHT: 185 LBS | HEART RATE: 76 BPM | RESPIRATION RATE: 16 BRPM | SYSTOLIC BLOOD PRESSURE: 190 MMHG | DIASTOLIC BLOOD PRESSURE: 100 MMHG | HEIGHT: 65 IN | BODY MASS INDEX: 30.82 KG/M2

## 2023-04-25 PROCEDURE — 93000 ELECTROCARDIOGRAM COMPLETE: CPT

## 2023-04-25 PROCEDURE — 99214 OFFICE O/P EST MOD 30 MIN: CPT | Mod: 25

## 2023-04-25 RX ORDER — ATORVASTATIN CALCIUM 40 MG/1
40 TABLET, FILM COATED ORAL
Qty: 90 | Refills: 2 | Status: ACTIVE | COMMUNITY
Start: 2021-10-08 | End: 1900-01-01

## 2023-04-25 NOTE — DISCUSSION/SUMMARY
[EKG obtained to assist in diagnosis and management of assessed problem(s)] : EKG obtained to assist in diagnosis and management of assessed problem(s) [FreeTextEntry1] : Patient is a 62yo M with HTN, HLD, gout, CKD here for cardiac follow up\par -Echo 10/2021 with mild LVH (likely due to HTN) but normal BiV function, no PHTN, Mild MR\par -Normal ELIZABETH/PVR 2021\par -PHARM NUCLEAR stress test 11/2021 negative for ischemia/infarct \par \par -Still stuggling with BP control, always higher here, also some inconsistency in taking meds. HAs not been taking his hydralazine. \par \par -Echo 2/2023 with progressive LVH/diastolic dysfunction, no clinical symptomatic CHF but at risk\par \par -Mild increase in AV delay, maybe related to metoprolol. No symptoms, no  this time\par \par \par \par 1. NEeds to get back on hydralazine and will he states \par 2. Recommend 30 minutes moderate intensity aerobic activity 5 days per week \par 3. Continue statin, will refill as also has not been taking \par 4. Recommend aggressive diet and lifestyle modifications , needs to cut back sodium. \par 5. Increase lisinopril to 40mg daily \par 6. GI follow up for GERD\par 7. Home Sleep study given snoring, obesity and resistant HTN\par 8.Regular ortho follow up with Dr Zaidi for shoulder issues\par 9. Follow up 3 months

## 2023-04-25 NOTE — HISTORY OF PRESENT ILLNESS
[FreeTextEntry1] : Patient is a 64yo M with HTN, HLD, GERD, gout, CKD here for cardiac follow up. NO regular exercise. Denies any chest pain/pressure, does get intermittent chronic shoulder pain and limited ROM.May need shoulder surgery, MRI is planned.  No CP/SOB. Patient denies PND/orthopnea/edema/palpitations/syncope/claudication. \par \par Lisinopril added prior to last OV and had echo done. Advised to get back on hydralazine as well. \par \par Works as .  with 4 kids that are grown. \par \par ROS: nighttime heart burn still, all others negative

## 2023-04-25 NOTE — ASSESSMENT
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 Moderate LVH, EF 60-65%\par 2. Grade I diastolic dysfunction with increased LAP\par 3. Normal RV/LA/RA \par 4. Trivial AI\par 5. Mild MR\par 6. Progressive LVH and increase LAP compared to prior \par \par ELIZABETH/PVR 10/2021:\par 1. R 1.02 L 1.02\par 2. Normal PVR\par \par \par ECHO 10/2021:\par 1. Mild LVH, EF 60-65%\par 2. Grade I diastolic dysfunction\par 3. Normal RV/LA/RA \par 4. Trivial AI\par 5. Mild MR \par \par PHARM NUCLEAR STRESS TEST 11/2021:\par 1. Negative for ischemia/infarct, EF 62%\par 2. BP hypertensive baseline, normal response

## 2023-05-10 ENCOUNTER — APPOINTMENT (OUTPATIENT)
Dept: ORTHOPEDIC SURGERY | Facility: CLINIC | Age: 63
End: 2023-05-10
Payer: OTHER MISCELLANEOUS

## 2023-05-10 VITALS — WEIGHT: 185 LBS | HEIGHT: 65 IN | BODY MASS INDEX: 30.82 KG/M2

## 2023-05-10 PROCEDURE — 99213 OFFICE O/P EST LOW 20 MIN: CPT

## 2023-05-10 NOTE — ASSESSMENT
[FreeTextEntry1] : R calcific tendonitis.\par H/o prior WC injury to shoulders.\par MRI R shoulder reviewed - full-thickness supra tear with retraction and atrophy, partial infra and subscap tears, GH DJD, bursitis.\par I do not think this tear is repairable. \par Discussed conservative vs surgical treatment options.\par Reviewed R SA, GHD, SAD, possible RCR, placement of balloon spacer vs RSA.\par Continue PT for strengthening.\par Consider SA injection.\par He is working full duty.\par RTO 6 weeks.

## 2023-05-10 NOTE — WORK
[FreeTextEntry1] : He is WFD. [Sprain/Strain] : sprain/strain [Torn Ligament/Tendon/Muscle] : torn ligament, tendon or muscle [Was the competent medical cause of the injury] : was the competent medical cause of the injury [Are consistent with the injury] : are consistent with the injury [Consistent with my objective findings] : consistent with my objective findings [Partial] : partial [Reveals pre-existing condition(s) that may affect treatment/prognosis] : reveals pre-existing condition(s) that may affect treatment/prognosis [Can return to work without limitations on ______] : can return to work without limitations on [unfilled] [FreeTextEntry2] : prior WC injury to b/l shoulders
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
## 2023-05-10 NOTE — PHYSICAL EXAM
[Left] : left shoulder [4 ___] : forward flexion 4[unfilled]/5 [5___] : external rotation 5[unfilled]/5 [de-identified] : +arc [Right] : right shoulder [] : no sensory deficits [FreeTextEntry9] : FE: 90A  120P\par ER: 30

## 2023-05-12 ENCOUNTER — APPOINTMENT (OUTPATIENT)
Dept: ORTHOPEDIC SURGERY | Facility: CLINIC | Age: 63
End: 2023-05-12

## 2023-05-26 ENCOUNTER — APPOINTMENT (OUTPATIENT)
Dept: ORTHOPEDIC SURGERY | Facility: CLINIC | Age: 63
End: 2023-05-26

## 2023-06-30 ENCOUNTER — APPOINTMENT (OUTPATIENT)
Dept: ORTHOPEDIC SURGERY | Facility: CLINIC | Age: 63
End: 2023-06-30
Payer: OTHER MISCELLANEOUS

## 2023-06-30 VITALS — BODY MASS INDEX: 30.82 KG/M2 | WEIGHT: 185 LBS | HEIGHT: 65 IN

## 2023-06-30 PROCEDURE — 99213 OFFICE O/P EST LOW 20 MIN: CPT

## 2023-06-30 NOTE — WORK
[Sprain/Strain] : sprain/strain [Torn Ligament/Tendon/Muscle] : torn ligament, tendon or muscle [Was the competent medical cause of the injury] : was the competent medical cause of the injury [Are consistent with the injury] : are consistent with the injury [Consistent with my objective findings] : consistent with my objective findings [Partial] : partial [Reveals pre-existing condition(s) that may affect treatment/prognosis] : reveals pre-existing condition(s) that may affect treatment/prognosis [Can return to work without limitations on ______] : can return to work without limitations on [unfilled] [FreeTextEntry2] : prior WC injury to b/l shoulders

## 2023-06-30 NOTE — ASSESSMENT
[FreeTextEntry1] : R calcific tendonitis.\par H/o prior WC injury to shoulders.\par MRI R shoulder reviewed - full-thickness supra tear with retraction and atrophy, partial infra and subscap tears, GH DJD, bursitis.\par I do not think this tear is repairable. \par Discussed conservative vs surgical treatment options.\par Reviewed R SA, GHD, SAD, possible RCR, placement of balloon spacer vs RSA.\par Continue PT for strengthening.\par HEP.\par Consider SA injection, he is hesitant.\par He is working full duty.\par RTO 6-8 weeks.
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
## 2023-07-20 ENCOUNTER — RESULT CHARGE (OUTPATIENT)
Age: 63
End: 2023-07-20

## 2023-07-21 ENCOUNTER — RX RENEWAL (OUTPATIENT)
Age: 63
End: 2023-07-21

## 2023-07-21 ENCOUNTER — APPOINTMENT (OUTPATIENT)
Dept: CARDIOLOGY | Facility: CLINIC | Age: 63
End: 2023-07-21
Payer: COMMERCIAL

## 2023-07-21 ENCOUNTER — NON-APPOINTMENT (OUTPATIENT)
Age: 63
End: 2023-07-21

## 2023-07-21 VITALS
DIASTOLIC BLOOD PRESSURE: 100 MMHG | OXYGEN SATURATION: 98 % | HEIGHT: 65 IN | SYSTOLIC BLOOD PRESSURE: 160 MMHG | HEART RATE: 59 BPM | RESPIRATION RATE: 15 BRPM | WEIGHT: 184 LBS | BODY MASS INDEX: 30.66 KG/M2

## 2023-07-21 PROCEDURE — 93000 ELECTROCARDIOGRAM COMPLETE: CPT

## 2023-07-21 PROCEDURE — 99214 OFFICE O/P EST MOD 30 MIN: CPT | Mod: 25

## 2023-07-21 NOTE — HISTORY OF PRESENT ILLNESS
[FreeTextEntry1] : Patient is a 62yo M with HTN, HLD, GERD, gout, CKD here for cardiac follow up. NO regular exercise. Denies any chest pain/pressure, does get intermittent chronic shoulder pain and limited ROM.May need shoulder surgery, MRI is planned.  No CP/SOB. Patient denies PND/orthopnea/edema/palpitations/syncope/claudication. \par \par Lisinopril increased last OV and advised to take his hydralazine regularly. \par \par \par Works as .  with 4 kids that are grown. \par \par \par ROS: nighttime heart burn still, all others negative

## 2023-07-21 NOTE — DISCUSSION/SUMMARY
[EKG obtained to assist in diagnosis and management of assessed problem(s)] : EKG obtained to assist in diagnosis and management of assessed problem(s) [FreeTextEntry1] : Patient is a 64yo M with HTN, HLD, gout, CKD here for cardiac follow up\par \par -Echo 2/2023 with progressive LVH/diastolic dysfunction, no clinical symptomatic CHF but at risk\par -Normal ELIZABETH/PVR 2021\par -PHARM NUCLEAR stress test 11/2021 negative for ischemia/infarct \par \par -Mild increase in AV delay recently, maybe related to metoprolol. No symptoms, no  \par \par -Still with resistant HTN, will arrange 24 hour home monitor and adjust accodingly. Consider work up secondary causes as well if not controlled on current regimen\par \par 1. Start back on hydralazine, will begin with 50mg bid as struggles with TID dosing. HAs not been taking regularly so start with lower dose. Also consider changing lisinopril to Edarbi. BAseline creatinine 1.5 from 11/2022. Will also consider changing metoprolol to Nebivolol now\par 2. Recommend 30 minutes moderate intensity aerobic activity 5 days per week \par 3. Continue statin, will refill as also has not been taking \par 4. Recommend aggressive diet and lifestyle modifications , needs to cut back sodium. \par 5. Follow up 2 months, will arrange home BP monitor\par 6. GI follow up for GERD\par 7. Home Sleep study given snoring, obesity and resistant HTN. Still has not done yet \par 8.Regular ortho follow up with Dr Zaidi for shoulder issues\par

## 2023-07-21 NOTE — ASSESSMENT
[FreeTextEntry1] : ECG: SR, LAFB, 1st degree AV delay \par \par BUN 15 Creat 1.50 (11/2022) \par  HDL 32   (8/2021) \par A1C 5.9 \par \par \par ECHO 2/2023:\par 1. Moderate LVH, EF 60-65%\par 2. Grade I diastolic dysfunction with increased LAP\par 3. Normal RV/LA/RA \par 4. Trivial AI\par 5. Mild MR\par 6. Progressive LVH and increase LAP compared to prior \par \par ELIZABETH/PVR 10/2021:\par 1. R 1.02 L 1.02\par 2. Normal PVR\par \par \par ECHO 10/2021:\par 1. Mild LVH, EF 60-65%\par 2. Grade I diastolic dysfunction\par 3. Normal RV/LA/RA \par 4. Trivial AI\par 5. Mild MR \par \par PHARM NUCLEAR STRESS TEST 11/2021:\par 1. Negative for ischemia/infarct, EF 62%\par 2. BP hypertensive baseline, normal response

## 2023-08-08 ENCOUNTER — APPOINTMENT (OUTPATIENT)
Dept: CARDIOLOGY | Facility: CLINIC | Age: 63
End: 2023-08-08
Payer: COMMERCIAL

## 2023-08-08 PROCEDURE — ZZZZZ: CPT

## 2023-08-11 ENCOUNTER — APPOINTMENT (OUTPATIENT)
Dept: ORTHOPEDIC SURGERY | Facility: CLINIC | Age: 63
End: 2023-08-11
Payer: OTHER MISCELLANEOUS

## 2023-08-11 VITALS — WEIGHT: 184 LBS | BODY MASS INDEX: 30.66 KG/M2 | HEIGHT: 65 IN

## 2023-08-11 PROCEDURE — 99213 OFFICE O/P EST LOW 20 MIN: CPT
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
## 2023-08-29 RX ORDER — METOPROLOL SUCCINATE 100 MG/1
100 TABLET, EXTENDED RELEASE ORAL DAILY
Qty: 90 | Refills: 2 | Status: DISCONTINUED | COMMUNITY
Start: 2021-10-08 | End: 2023-08-29

## 2023-08-29 RX ORDER — LISINOPRIL 40 MG/1
40 TABLET ORAL DAILY
Qty: 90 | Refills: 3 | Status: DISCONTINUED | COMMUNITY
Start: 2022-12-06 | End: 2023-08-29

## 2023-09-22 ENCOUNTER — APPOINTMENT (OUTPATIENT)
Dept: ORTHOPEDIC SURGERY | Facility: CLINIC | Age: 63
End: 2023-09-22
Payer: OTHER MISCELLANEOUS

## 2023-09-22 VITALS — HEIGHT: 65 IN | BODY MASS INDEX: 30.66 KG/M2 | WEIGHT: 184 LBS

## 2023-09-22 PROCEDURE — 99213 OFFICE O/P EST LOW 20 MIN: CPT

## 2023-09-28 ENCOUNTER — APPOINTMENT (OUTPATIENT)
Dept: CARDIOLOGY | Facility: CLINIC | Age: 63
End: 2023-09-28
Payer: COMMERCIAL

## 2023-09-28 VITALS
BODY MASS INDEX: 29.99 KG/M2 | DIASTOLIC BLOOD PRESSURE: 90 MMHG | WEIGHT: 180 LBS | OXYGEN SATURATION: 96 % | SYSTOLIC BLOOD PRESSURE: 150 MMHG | HEIGHT: 65 IN | RESPIRATION RATE: 15 BRPM | HEART RATE: 80 BPM

## 2023-09-28 PROCEDURE — 93000 ELECTROCARDIOGRAM COMPLETE: CPT

## 2023-09-28 PROCEDURE — 99214 OFFICE O/P EST MOD 30 MIN: CPT | Mod: 25

## 2023-10-03 RX ORDER — AZILSARTAN KAMEDOXOMIL 80 MG/1
80 TABLET ORAL DAILY
Qty: 90 | Refills: 1 | Status: DISCONTINUED | COMMUNITY
Start: 2023-08-29 | End: 2023-10-03

## 2023-10-27 ENCOUNTER — OUTPATIENT (OUTPATIENT)
Dept: OUTPATIENT SERVICES | Facility: HOSPITAL | Age: 63
LOS: 1 days | End: 2023-10-27
Payer: COMMERCIAL

## 2023-10-27 DIAGNOSIS — G47.33 OBSTRUCTIVE SLEEP APNEA (ADULT) (PEDIATRIC): ICD-10-CM

## 2023-10-27 PROCEDURE — 95800 SLP STDY UNATTENDED: CPT

## 2023-10-27 PROCEDURE — 95800 SLP STDY UNATTENDED: CPT | Mod: 26

## 2023-11-07 ENCOUNTER — NON-APPOINTMENT (OUTPATIENT)
Age: 63
End: 2023-11-07

## 2023-11-10 ENCOUNTER — APPOINTMENT (OUTPATIENT)
Dept: ORTHOPEDIC SURGERY | Facility: CLINIC | Age: 63
End: 2023-11-10
Payer: OTHER MISCELLANEOUS

## 2023-11-10 VITALS — BODY MASS INDEX: 29.99 KG/M2 | WEIGHT: 180 LBS | HEIGHT: 65 IN

## 2023-11-10 PROCEDURE — 99213 OFFICE O/P EST LOW 20 MIN: CPT

## 2023-12-05 ENCOUNTER — RX RENEWAL (OUTPATIENT)
Age: 63
End: 2023-12-05

## 2023-12-05 RX ORDER — AMLODIPINE BESYLATE 10 MG/1
10 TABLET ORAL DAILY
Qty: 90 | Refills: 2 | Status: ACTIVE | COMMUNITY
Start: 2023-12-05 | End: 1900-01-01

## 2023-12-08 ENCOUNTER — APPOINTMENT (OUTPATIENT)
Dept: CARDIOLOGY | Facility: CLINIC | Age: 63
End: 2023-12-08
Payer: COMMERCIAL

## 2023-12-08 ENCOUNTER — NON-APPOINTMENT (OUTPATIENT)
Age: 63
End: 2023-12-08

## 2023-12-08 VITALS
RESPIRATION RATE: 15 BRPM | WEIGHT: 185 LBS | SYSTOLIC BLOOD PRESSURE: 146 MMHG | HEART RATE: 77 BPM | DIASTOLIC BLOOD PRESSURE: 88 MMHG | HEIGHT: 65 IN | OXYGEN SATURATION: 97 % | BODY MASS INDEX: 30.82 KG/M2

## 2023-12-08 DIAGNOSIS — R94.31 ABNORMAL ELECTROCARDIOGRAM [ECG] [EKG]: ICD-10-CM

## 2023-12-08 PROCEDURE — 99214 OFFICE O/P EST MOD 30 MIN: CPT | Mod: 25

## 2023-12-08 PROCEDURE — 93000 ELECTROCARDIOGRAM COMPLETE: CPT

## 2023-12-08 RX ORDER — HYDRALAZINE HYDROCHLORIDE 50 MG/1
50 TABLET ORAL
Qty: 180 | Refills: 2 | Status: ACTIVE | COMMUNITY
Start: 1900-01-01 | End: 1900-01-01

## 2023-12-22 ENCOUNTER — APPOINTMENT (OUTPATIENT)
Dept: PULMONOLOGY | Facility: CLINIC | Age: 63
End: 2023-12-22
Payer: COMMERCIAL

## 2023-12-22 VITALS
OXYGEN SATURATION: 95 % | BODY MASS INDEX: 30.82 KG/M2 | SYSTOLIC BLOOD PRESSURE: 148 MMHG | WEIGHT: 185 LBS | DIASTOLIC BLOOD PRESSURE: 82 MMHG | RESPIRATION RATE: 16 BRPM | HEIGHT: 65 IN | HEART RATE: 72 BPM

## 2023-12-22 DIAGNOSIS — R06.83 SNORING: ICD-10-CM

## 2023-12-22 DIAGNOSIS — K21.9 GASTRO-ESOPHAGEAL REFLUX DISEASE W/OUT ESOPHAGITIS: ICD-10-CM

## 2023-12-22 PROCEDURE — 99203 OFFICE O/P NEW LOW 30 MIN: CPT

## 2023-12-22 NOTE — CONSULT LETTER
[Dear  ___] : Dear  [unfilled], [Consult Letter:] : I had the pleasure of evaluating your patient, [unfilled]. [Please see my note below.] : Please see my note below. [Consult Closing:] : Thank you very much for allowing me to participate in the care of this patient.  If you have any questions, please do not hesitate to contact me. [Sincerely,] : Sincerely, [DrJames  ___] : Dr. MAGUIRE

## 2023-12-22 NOTE — HISTORY OF PRESENT ILLNESS
[Obstructive Sleep Apnea] : obstructive sleep apnea [Awakes Unrefreshed] : awakes unrefreshed [Awakes with Dry Mouth] : awakes with dry mouth [Daytime Somnolence] : daytime somnolence [Nonrestorative Sleep] : nonrestorative sleep [Snoring] : snoring [TextBox_4] : 12/22/23  Sent for sleep evaluation and management after having a home sleep test. 63 y.o obese male HTN GERD [ESS] : 8

## 2023-12-28 ENCOUNTER — APPOINTMENT (OUTPATIENT)
Dept: ORTHOPEDIC SURGERY | Facility: CLINIC | Age: 63
End: 2023-12-28
Payer: OTHER MISCELLANEOUS

## 2023-12-28 VITALS — BODY MASS INDEX: 30.82 KG/M2 | HEIGHT: 65 IN | WEIGHT: 185 LBS

## 2023-12-28 PROCEDURE — 99213 OFFICE O/P EST LOW 20 MIN: CPT

## 2023-12-28 NOTE — ASSESSMENT
[FreeTextEntry1] : R calcific tendonitis. H/o prior WC injury to shoulders. MRI R shoulder reviewed - full-thickness supra tear with retraction and atrophy, partial infra and subscap tears, GH DJD, bursitis. I do not think this tear is repairable. Discussed conservative vs surgical treatment options. Reviewed R SA, GHD, SAD, possible RCR, placement of balloon spacer vs RSA. Recommend resuming formal PT as this was helping. HEP. Consider SA injection, he is hesitant. He is working full duty. RTO 6-8 weeks.
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
## 2023-12-28 NOTE — PHYSICAL EXAM
[Right] : right shoulder [5 ___] : forward flexion 5[unfilled]/5 [5___] : internal rotation 5[unfilled]/5 [] : no sensory deficits [FreeTextEntry9] : FE: 140 ER: 40

## 2024-02-23 ENCOUNTER — APPOINTMENT (OUTPATIENT)
Dept: ORTHOPEDIC SURGERY | Facility: CLINIC | Age: 64
End: 2024-02-23
Payer: OTHER MISCELLANEOUS

## 2024-02-23 VITALS — HEIGHT: 65 IN | BODY MASS INDEX: 30.82 KG/M2 | WEIGHT: 185 LBS

## 2024-02-23 VITALS — BODY MASS INDEX: 30.82 KG/M2 | HEIGHT: 65 IN | WEIGHT: 185 LBS

## 2024-02-23 DIAGNOSIS — M75.31 CALCIFIC TENDINITIS OF RIGHT SHOULDER: ICD-10-CM

## 2024-02-23 PROCEDURE — 99214 OFFICE O/P EST MOD 30 MIN: CPT

## 2024-02-23 NOTE — PHYSICAL EXAM
[Right] : right shoulder [5 ___] : forward flexion 5[unfilled]/5 [5___] : internal rotation 5[unfilled]/5 [] : no sensory deficits [FreeTextEntry9] : FE: 140, passive 160 ER: 40
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
## 2024-02-23 NOTE — ASSESSMENT
[FreeTextEntry1] : R calcific tendonitis. H/o prior WC injury to shoulders. MRI R shoulder reviewed - full-thickness supra tear with retraction and atrophy, partial infra and subscap tears, GH DJD, bursitis. I do not think this tear is repairable. Discussed conservative vs surgical treatment options. Reviewed R SA, GHD, SAD, possible RCR, placement of balloon spacer vs RSA. Please authorize PT program for the right shoulder.  HEP. Consider SA injection, he is hesitant. He is working full duty. RTO 6-8 weeks.

## 2024-04-05 ENCOUNTER — APPOINTMENT (OUTPATIENT)
Dept: CARDIOLOGY | Facility: CLINIC | Age: 64
End: 2024-04-05
Payer: COMMERCIAL

## 2024-04-05 PROCEDURE — 93306 TTE W/DOPPLER COMPLETE: CPT

## 2024-04-12 ENCOUNTER — APPOINTMENT (OUTPATIENT)
Dept: ORTHOPEDIC SURGERY | Facility: CLINIC | Age: 64
End: 2024-04-12
Payer: OTHER MISCELLANEOUS

## 2024-04-12 ENCOUNTER — NON-APPOINTMENT (OUTPATIENT)
Age: 64
End: 2024-04-12

## 2024-04-12 ENCOUNTER — APPOINTMENT (OUTPATIENT)
Dept: CARDIOLOGY | Facility: CLINIC | Age: 64
End: 2024-04-12
Payer: COMMERCIAL

## 2024-04-12 VITALS
WEIGHT: 182 LBS | HEART RATE: 103 BPM | BODY MASS INDEX: 30.32 KG/M2 | RESPIRATION RATE: 16 BRPM | OXYGEN SATURATION: 96 % | DIASTOLIC BLOOD PRESSURE: 82 MMHG | HEIGHT: 65 IN | SYSTOLIC BLOOD PRESSURE: 144 MMHG

## 2024-04-12 VITALS — HEIGHT: 65 IN | WEIGHT: 185 LBS | BODY MASS INDEX: 30.82 KG/M2

## 2024-04-12 DIAGNOSIS — I34.0 NONRHEUMATIC MITRAL (VALVE) INSUFFICIENCY: ICD-10-CM

## 2024-04-12 DIAGNOSIS — E78.5 HYPERLIPIDEMIA, UNSPECIFIED: ICD-10-CM

## 2024-04-12 DIAGNOSIS — I51.7 CARDIOMEGALY: ICD-10-CM

## 2024-04-12 DIAGNOSIS — I10 ESSENTIAL (PRIMARY) HYPERTENSION: ICD-10-CM

## 2024-04-12 DIAGNOSIS — E66.9 OBESITY, UNSPECIFIED: ICD-10-CM

## 2024-04-12 DIAGNOSIS — G47.33 OBSTRUCTIVE SLEEP APNEA (ADULT) (PEDIATRIC): ICD-10-CM

## 2024-04-12 DIAGNOSIS — N18.9 CHRONIC KIDNEY DISEASE, UNSPECIFIED: ICD-10-CM

## 2024-04-12 PROCEDURE — 99214 OFFICE O/P EST MOD 30 MIN: CPT

## 2024-04-12 PROCEDURE — 93000 ELECTROCARDIOGRAM COMPLETE: CPT

## 2024-04-12 PROCEDURE — 99213 OFFICE O/P EST LOW 20 MIN: CPT

## 2024-04-12 PROCEDURE — G2211 COMPLEX E/M VISIT ADD ON: CPT

## 2024-04-12 RX ORDER — NEBIVOLOL 10 MG/1
10 TABLET ORAL DAILY
Qty: 90 | Refills: 3 | Status: ACTIVE | COMMUNITY
Start: 2023-08-29 | End: 1900-01-01
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
## 2024-04-12 NOTE — ASSESSMENT
[FreeTextEntry1] : H/o prior WC injury to shoulders. MRI R shoulder reviewed - full-thickness supra tear with retraction and atrophy, partial infra and subscap tears, GH DJD, bursitis. I do not think this tear is repairable. Discussed conservative vs surgical treatment options. Reviewed R SA, GHD, SAD, possible RCR, placement of balloon spacer vs RSA. Continue PT.  Consider SA injection, he is hesitant. He is working full duty. RTO 6-8 weeks.

## 2024-04-12 NOTE — ASSESSMENT
[FreeTextEntry1] : ECG: SR, LAFB, early repolarization   BUN 15 Creat 1.50 (11/2022)  HDL 32   (8/2021) A1C 5.9  ECHO 4/2024: 1. Mild to moderate LVH, EF 70-75% 2. Grade I diastolic dysfunction  3. RVSP 24mmHg  ECHO 2/2023: 1. Moderate LVH, EF 60-65% 2. Grade I diastolic dysfunction with increased LAP 3. Normal RV/LA/RA 4. Trivial AI 5. Mild MR 6. Progressive LVH and increase LAP compared to prior  ELIZABETH/PVR 10/2021: 1. R 1.02 L 1.02 2. Normal PVR  ECHO 10/2021: 1. Mild LVH, EF 60-65% 2. Grade I diastolic dysfunction 3. Normal RV/LA/RA 4. Trivial AI 5. Mild MR  PHARM NUCLEAR STRESS TEST 11/2021: 1. Negative for ischemia/infarct, EF 62% 2. BP hypertensive baseline, normal response.

## 2024-04-12 NOTE — DISCUSSION/SUMMARY
[EKG obtained to assist in diagnosis and management of assessed problem(s)] : EKG obtained to assist in diagnosis and management of assessed problem(s) [FreeTextEntry1] : Patient is a 62yo M with HTN, HLD, GERD, gout, CKD, severe SEKOU, LVH here for cardiac follow up. -ECG essentially unchanged -Echo 2/2023 with progressive LVH/diastolic dysfunction -Echo 4/2024 wtih LVH and grade I diastolic dysfunction, stable findings , at risk CHF  -Normal ELIZABETH/PVR 2021 -PHARM NUCLEAR stress test 11/2021 negative for ischemia/infarct  -CV stable, BP mildly elevated but off nebivolol and will restart . Partly explains elevated HR as well   1. Continue amlodipine/NEbivolol/hydralazine/Olmesartan.  2. Recommend 30 minutes moderate intensity aerobic activity 5 days per week  3. Continue statin, recheck lipids at follow up in 4 months 4. Pulm follow up for severe SEKOU, awaiting his CPAP still and planned treatment 5. Follow up 4 months , expect improvement in BP with treatment of SEKOU and getting back on Nebivolol  6. Regular PMD follow up and routing BW

## 2024-04-12 NOTE — HISTORY OF PRESENT ILLNESS
[FreeTextEntry1] : Patient is a 62yo M with HTN, HLD, GERD, gout, CKD, severe SEKOU, LVH here for cardiac follow up.  MEds have been changed recently for his resistant HTN. Had 24 hour BP monitor. ADvised changing beta blocker to Nebivolol 10mg daily, also start Edarbi 80mg daily, and hydralazint 50mg bid. Insurance did not cover edarbi so on Olmesartan . Has not been taking Nebivolol.   Mild cough recently and poor appetite, granddaughter sick as well. Starting to feel better. Denies CP/SOB. Patient denies PND/orthopnea/edema/palpitations/syncope/claudication.   Works as .  with 4 kids that are grown.    ROS: nighttime heart burn still, all others negative

## 2024-05-28 ENCOUNTER — RX RENEWAL (OUTPATIENT)
Age: 64
End: 2024-05-28

## 2024-05-28 RX ORDER — OLMESARTAN MEDOXOMIL 40 MG/1
40 TABLET, FILM COATED ORAL
Qty: 90 | Refills: 2 | Status: ACTIVE | COMMUNITY
Start: 2023-10-03 | End: 1900-01-01

## 2024-06-28 ENCOUNTER — APPOINTMENT (OUTPATIENT)
Dept: ORTHOPEDIC SURGERY | Facility: CLINIC | Age: 64
End: 2024-06-28
Payer: OTHER MISCELLANEOUS

## 2024-06-28 VITALS — BODY MASS INDEX: 30.32 KG/M2 | WEIGHT: 182 LBS | HEIGHT: 65 IN

## 2024-06-28 DIAGNOSIS — M75.121 COMPLETE ROTATOR CUFF TEAR OR RUPTURE OF RIGHT SHOULDER, NOT SPECIFIED AS TRAUMATIC: ICD-10-CM

## 2024-06-28 PROCEDURE — 99213 OFFICE O/P EST LOW 20 MIN: CPT

## 2024-08-16 ENCOUNTER — NON-APPOINTMENT (OUTPATIENT)
Age: 64
End: 2024-08-16

## 2024-08-16 ENCOUNTER — APPOINTMENT (OUTPATIENT)
Dept: CARDIOLOGY | Facility: CLINIC | Age: 64
End: 2024-08-16
Payer: COMMERCIAL

## 2024-08-16 VITALS
OXYGEN SATURATION: 96 % | BODY MASS INDEX: 30.82 KG/M2 | HEIGHT: 65 IN | HEART RATE: 67 BPM | SYSTOLIC BLOOD PRESSURE: 140 MMHG | WEIGHT: 185 LBS | RESPIRATION RATE: 16 BRPM | DIASTOLIC BLOOD PRESSURE: 82 MMHG

## 2024-08-16 DIAGNOSIS — I10 ESSENTIAL (PRIMARY) HYPERTENSION: ICD-10-CM

## 2024-08-16 DIAGNOSIS — I51.7 CARDIOMEGALY: ICD-10-CM

## 2024-08-16 DIAGNOSIS — G47.33 OBSTRUCTIVE SLEEP APNEA (ADULT) (PEDIATRIC): ICD-10-CM

## 2024-08-16 DIAGNOSIS — E78.5 HYPERLIPIDEMIA, UNSPECIFIED: ICD-10-CM

## 2024-08-16 DIAGNOSIS — I34.0 NONRHEUMATIC MITRAL (VALVE) INSUFFICIENCY: ICD-10-CM

## 2024-08-16 PROCEDURE — 93000 ELECTROCARDIOGRAM COMPLETE: CPT

## 2024-08-16 PROCEDURE — 99214 OFFICE O/P EST MOD 30 MIN: CPT

## 2024-08-16 PROCEDURE — G2211 COMPLEX E/M VISIT ADD ON: CPT

## 2024-08-16 NOTE — DISCUSSION/SUMMARY
[EKG obtained to assist in diagnosis and management of assessed problem(s)] : EKG obtained to assist in diagnosis and management of assessed problem(s) [FreeTextEntry1] : Patient is a 63yo M with HTN, HLD, GERD, gout, CKD, severe SEKOU, LVH here for cardiac follow up.  RESISTENT HTN -Echo 2/2023 with progressive LVH/diastolic dysfunction -Echo 4/2024 wtih LVH and grade I diastolic dysfunction, stable findings , at risk CHF  -PHARM NUCLEAR stress test 11/2021 negative for ischemia/infarct  -On multiple meds now, improved but mildly above goal still   SEKOU: -on CPAP, pulm follow up. Struggling to tolerate, awaiting nasal CPAP now  HLD -On statin, will recheck lipids    1. Continue amlodipine/NEbivolol/hydralazine/Olmesartan. Increase Nebivolol to 20mg daily 2. Recommend 30 minutes moderate intensity aerobic activity 5 days per week  3. Continue statin, recheck lipids . Needs to get back on statin as states not taking 4. Pulm follow up for severe SEKOU, awaiting Nasal CPAP now 5. Follow up 3 months , repeat ABPM at that time to evaluate HTN and check lipids as well as CMP/Mg 6. Recommend GI evaluation for GERD

## 2024-08-16 NOTE — HISTORY OF PRESENT ILLNESS
[FreeTextEntry1] : Patient is a 65yo M with HTN, HLD, GERD, gout, CKD, severe SEKOU, LVH here for cardiac follow up.  BP meds continue to be adjusted in recent months, advised get back on Nebivolol last OV and was to start CPAP, struggling to get used to.  Denies CP/SOB. Patient denies PND/orthopnea/edema/palpitations/syncope/claudication. No new complaints. At times misses doses of meds but overall good adherence.   Works as .  with 4 kids that are grown.   ROS: nighttime heart burn still, all others negative

## 2024-09-19 ENCOUNTER — APPOINTMENT (OUTPATIENT)
Dept: ORTHOPEDIC SURGERY | Facility: CLINIC | Age: 64
End: 2024-09-19
Payer: OTHER MISCELLANEOUS

## 2024-09-19 VITALS — BODY MASS INDEX: 30.82 KG/M2 | WEIGHT: 185 LBS | HEIGHT: 65 IN

## 2024-09-19 DIAGNOSIS — M75.121 COMPLETE ROTATOR CUFF TEAR OR RUPTURE OF RIGHT SHOULDER, NOT SPECIFIED AS TRAUMATIC: ICD-10-CM

## 2024-09-19 PROCEDURE — 99213 OFFICE O/P EST LOW 20 MIN: CPT

## 2024-09-19 NOTE — PHYSICAL EXAM
[Right] : right shoulder [4___] : external rotation 4[unfilled]/5 [] : no ecchymosis [FreeTextEntry9] : FE: 130A 140P ER: 40

## 2024-09-19 NOTE — ASSESSMENT
[FreeTextEntry1] : H/o prior WC injury to shoulders. MRI R shoulder - full-thickness supra tear with retraction and atrophy, partial infra and subscap tears, GH DJD, bursitis. Reviewed R SA, GHD, SAD, possible RCR, placement of balloon spacer vs RSA. We will try to submit for more PT.  HEP for strengthening. Consider SA injection, he is hesitant. He is working full duty. RTO 6-8 weeks.
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
## 2024-10-31 ENCOUNTER — APPOINTMENT (OUTPATIENT)
Dept: ORTHOPEDIC SURGERY | Facility: CLINIC | Age: 64
End: 2024-10-31

## 2024-11-04 ENCOUNTER — APPOINTMENT (OUTPATIENT)
Dept: CARDIOLOGY | Facility: CLINIC | Age: 64
End: 2024-11-04

## 2024-12-03 ENCOUNTER — RX RENEWAL (OUTPATIENT)
Age: 64
End: 2024-12-03

## 2024-12-12 ENCOUNTER — APPOINTMENT (OUTPATIENT)
Dept: CARDIOLOGY | Facility: CLINIC | Age: 64
End: 2024-12-12

## 2024-12-17 ENCOUNTER — NON-APPOINTMENT (OUTPATIENT)
Age: 64
End: 2024-12-17

## 2024-12-17 PROCEDURE — 93784 AMBL BP MNTR W/SOFTWARE: CPT

## 2024-12-20 ENCOUNTER — NON-APPOINTMENT (OUTPATIENT)
Age: 64
End: 2024-12-20

## 2024-12-20 ENCOUNTER — APPOINTMENT (OUTPATIENT)
Dept: CARDIOLOGY | Facility: CLINIC | Age: 64
End: 2024-12-20
Payer: COMMERCIAL

## 2024-12-20 VITALS
BODY MASS INDEX: 30.82 KG/M2 | DIASTOLIC BLOOD PRESSURE: 88 MMHG | WEIGHT: 185 LBS | HEIGHT: 65 IN | SYSTOLIC BLOOD PRESSURE: 150 MMHG | HEART RATE: 59 BPM

## 2024-12-20 DIAGNOSIS — E78.5 HYPERLIPIDEMIA, UNSPECIFIED: ICD-10-CM

## 2024-12-20 DIAGNOSIS — G47.33 OBSTRUCTIVE SLEEP APNEA (ADULT) (PEDIATRIC): ICD-10-CM

## 2024-12-20 DIAGNOSIS — I34.0 NONRHEUMATIC MITRAL (VALVE) INSUFFICIENCY: ICD-10-CM

## 2024-12-20 DIAGNOSIS — N18.9 CHRONIC KIDNEY DISEASE, UNSPECIFIED: ICD-10-CM

## 2024-12-20 DIAGNOSIS — I51.7 CARDIOMEGALY: ICD-10-CM

## 2024-12-20 DIAGNOSIS — I10 ESSENTIAL (PRIMARY) HYPERTENSION: ICD-10-CM

## 2024-12-20 PROCEDURE — 99214 OFFICE O/P EST MOD 30 MIN: CPT

## 2024-12-20 PROCEDURE — G2211 COMPLEX E/M VISIT ADD ON: CPT | Mod: NC

## 2024-12-20 PROCEDURE — 93000 ELECTROCARDIOGRAM COMPLETE: CPT

## 2025-02-28 ENCOUNTER — APPOINTMENT (OUTPATIENT)
Dept: ORTHOPEDIC SURGERY | Facility: CLINIC | Age: 65
End: 2025-02-28
Payer: OTHER MISCELLANEOUS

## 2025-02-28 VITALS — HEIGHT: 65 IN | BODY MASS INDEX: 30.82 KG/M2 | WEIGHT: 185 LBS

## 2025-02-28 DIAGNOSIS — M75.121 COMPLETE ROTATOR CUFF TEAR OR RUPTURE OF RIGHT SHOULDER, NOT SPECIFIED AS TRAUMATIC: ICD-10-CM

## 2025-02-28 PROCEDURE — 99455 WORK RELATED DISABILITY EXAM: CPT

## 2025-03-24 ENCOUNTER — NON-APPOINTMENT (OUTPATIENT)
Age: 65
End: 2025-03-24

## 2025-03-24 ENCOUNTER — APPOINTMENT (OUTPATIENT)
Dept: CARDIOLOGY | Facility: CLINIC | Age: 65
End: 2025-03-24
Payer: COMMERCIAL

## 2025-03-24 VITALS
BODY MASS INDEX: 30.49 KG/M2 | RESPIRATION RATE: 16 BRPM | DIASTOLIC BLOOD PRESSURE: 94 MMHG | SYSTOLIC BLOOD PRESSURE: 142 MMHG | WEIGHT: 183 LBS | HEART RATE: 68 BPM | HEIGHT: 65 IN

## 2025-03-24 DIAGNOSIS — I10 ESSENTIAL (PRIMARY) HYPERTENSION: ICD-10-CM

## 2025-03-24 DIAGNOSIS — G47.33 OBSTRUCTIVE SLEEP APNEA (ADULT) (PEDIATRIC): ICD-10-CM

## 2025-03-24 DIAGNOSIS — I51.7 CARDIOMEGALY: ICD-10-CM

## 2025-03-24 DIAGNOSIS — E66.9 OBESITY, UNSPECIFIED: ICD-10-CM

## 2025-03-24 DIAGNOSIS — E78.5 HYPERLIPIDEMIA, UNSPECIFIED: ICD-10-CM

## 2025-03-24 PROCEDURE — 99214 OFFICE O/P EST MOD 30 MIN: CPT

## 2025-03-24 PROCEDURE — G2211 COMPLEX E/M VISIT ADD ON: CPT | Mod: NC

## 2025-03-24 PROCEDURE — 93000 ELECTROCARDIOGRAM COMPLETE: CPT

## 2025-06-04 ENCOUNTER — APPOINTMENT (OUTPATIENT)
Dept: CARDIOLOGY | Facility: CLINIC | Age: 65
End: 2025-06-04

## 2025-07-24 ENCOUNTER — APPOINTMENT (OUTPATIENT)
Dept: CARDIOLOGY | Facility: CLINIC | Age: 65
End: 2025-07-24
Payer: COMMERCIAL

## 2025-07-24 VITALS
WEIGHT: 188 LBS | DIASTOLIC BLOOD PRESSURE: 90 MMHG | BODY MASS INDEX: 31.32 KG/M2 | HEART RATE: 68 BPM | HEIGHT: 65 IN | SYSTOLIC BLOOD PRESSURE: 172 MMHG

## 2025-07-24 DIAGNOSIS — R94.31 ABNORMAL ELECTROCARDIOGRAM [ECG] [EKG]: ICD-10-CM

## 2025-07-24 DIAGNOSIS — I10 ESSENTIAL (PRIMARY) HYPERTENSION: ICD-10-CM

## 2025-07-24 DIAGNOSIS — N18.9 CHRONIC KIDNEY DISEASE, UNSPECIFIED: ICD-10-CM

## 2025-07-24 DIAGNOSIS — I51.7 CARDIOMEGALY: ICD-10-CM

## 2025-07-24 DIAGNOSIS — I34.0 NONRHEUMATIC MITRAL (VALVE) INSUFFICIENCY: ICD-10-CM

## 2025-07-24 DIAGNOSIS — E78.5 HYPERLIPIDEMIA, UNSPECIFIED: ICD-10-CM

## 2025-07-24 PROCEDURE — 93000 ELECTROCARDIOGRAM COMPLETE: CPT

## 2025-07-24 PROCEDURE — G2211 COMPLEX E/M VISIT ADD ON: CPT | Mod: NC

## 2025-07-24 PROCEDURE — 99214 OFFICE O/P EST MOD 30 MIN: CPT
